# Patient Record
Sex: FEMALE | Race: WHITE | NOT HISPANIC OR LATINO | URBAN - METROPOLITAN AREA
[De-identification: names, ages, dates, MRNs, and addresses within clinical notes are randomized per-mention and may not be internally consistent; named-entity substitution may affect disease eponyms.]

---

## 2017-03-23 ENCOUNTER — IMPORTED ENCOUNTER (OUTPATIENT)
Dept: URBAN - METROPOLITAN AREA CLINIC 9 | Facility: CLINIC | Age: 57
End: 2017-03-23

## 2018-09-10 ENCOUNTER — IMPORTED ENCOUNTER (OUTPATIENT)
Dept: URBAN - METROPOLITAN AREA CLINIC 9 | Facility: CLINIC | Age: 58
End: 2018-09-10

## 2019-09-25 ENCOUNTER — IMPORTED ENCOUNTER (OUTPATIENT)
Dept: URBAN - METROPOLITAN AREA CLINIC 9 | Facility: CLINIC | Age: 59
End: 2019-09-25

## 2020-09-25 ENCOUNTER — IMPORTED ENCOUNTER (OUTPATIENT)
Dept: URBAN - METROPOLITAN AREA CLINIC 9 | Facility: CLINIC | Age: 60
End: 2020-09-25

## 2020-09-25 PROBLEM — H04.123: Noted: 2020-09-25

## 2020-09-25 PROBLEM — H25.13: Noted: 2020-09-25

## 2021-09-24 NOTE — PATIENT DISCUSSION
CATARACTS, OU - VISUALLY SIGNIFICANT ALTHOUGH PATIENT NOT BOTHERED. DISC OPTION OF EUD-NE-OGPHIA. GLASSES RX GIVEN TO FILL IF DESIRES. RE-EVALUATE IN 1 YEAR, SOONER IF VISION OR GLARE WORSENS.

## 2021-10-18 ASSESSMENT — VISUAL ACUITY
OD_CC: 20/20 SN
OD_SC: 20/20 - SN
OS_CC: 20/20 SN
OS_CC: 20/20 - SN
OS_SC: 20/20 - SN
OS_SC: 20/20 - SN
OS_CC: 20/20 SN
OD_SC: 20/20 - SN
OD_CC: 20/20 SN
OD_CC: 20/20 SN
OD_SC: 20/20 - SN
OD_CC: 20/20 SN
OD_SC: 20/20 -2 SN
OS_SC: 20/20 SN
OS_SC: 20/20 SN
OS_CC: 20/20 SN

## 2021-10-18 ASSESSMENT — KERATOMETRY
OD_K2POWER_DIOPTERS: 45.75
OS_AXISANGLE_DEGREES: 19
OS_K1POWER_DIOPTERS: 45.75
OS_K2POWER_DIOPTERS: 46.25
OD_AXISANGLE2_DEGREES: 90
OD_AXISANGLE_DEGREES: 180
OD_K1POWER_DIOPTERS: 45.25
OS_AXISANGLE_DEGREES: 7
OD_K1POWER_DIOPTERS: 45.25
OD_K1POWER_DIOPTERS: 44.75
OS_AXISANGLE_DEGREES: 180
OS_K2POWER_DIOPTERS: 45.75
OD_K1POWER_DIOPTERS: 45.25
OD_AXISANGLE2_DEGREES: 115
OD_AXISANGLE_DEGREES: 25
OD_AXISANGLE_DEGREES: 180
OS_AXISANGLE_DEGREES: 1
OD_AXISANGLE_DEGREES: 17
OS_K1POWER_DIOPTERS: 46
OS_AXISANGLE2_DEGREES: 97
OS_AXISANGLE2_DEGREES: 90
OD_AXISANGLE2_DEGREES: 90
OS_K1POWER_DIOPTERS: 45.5
OS_K2POWER_DIOPTERS: 46.25
OS_K2POWER_DIOPTERS: 45.75
OS_AXISANGLE2_DEGREES: 109
OD_K2POWER_DIOPTERS: 45.25
OS_AXISANGLE2_DEGREES: 91
OD_K2POWER_DIOPTERS: 45.25
OD_AXISANGLE2_DEGREES: 107
OD_K2POWER_DIOPTERS: 45.5
OS_K1POWER_DIOPTERS: 45.75

## 2021-10-18 ASSESSMENT — TONOMETRY
OD_IOP_MMHG: 14
OD_IOP_MMHG: 15
OD_IOP_MMHG: 14
OS_IOP_MMHG: 15
OS_IOP_MMHG: 12
OS_IOP_MMHG: 17
OS_IOP_MMHG: 15
OD_IOP_MMHG: 13

## 2022-01-05 ENCOUNTER — ESTABLISHED PATIENT (OUTPATIENT)
Dept: URBAN - METROPOLITAN AREA CLINIC 9 | Facility: CLINIC | Age: 62
End: 2022-01-05

## 2022-01-05 DIAGNOSIS — H25.13: ICD-10-CM

## 2022-01-05 DIAGNOSIS — H52.223: ICD-10-CM

## 2022-01-05 PROCEDURE — 92014 COMPRE OPH EXAM EST PT 1/>: CPT

## 2022-01-05 PROCEDURE — 92015 DETERMINE REFRACTIVE STATE: CPT

## 2022-01-05 ASSESSMENT — VISUAL ACUITY
OS_GLARE: 20/20
OS_SC: 20/20
OD_GLARE: 20/20
OD_SC: 20/20
OU_SC: 20/20

## 2022-01-05 ASSESSMENT — KERATOMETRY
OD_AXISANGLE2_DEGREES: 98
OS_AXISANGLE_DEGREES: 179
OS_K2POWER_DIOPTERS: 46.00
OD_K1POWER_DIOPTERS: 45.25
OS_AXISANGLE2_DEGREES: 89
OS_K1POWER_DIOPTERS: 45.75
OD_AXISANGLE_DEGREES: 8
OD_K2POWER_DIOPTERS: 45.50

## 2022-01-05 ASSESSMENT — TONOMETRY
OS_IOP_MMHG: 15
OD_IOP_MMHG: 15

## 2022-06-19 RX ORDER — MELOXICAM 15 MG/1
TABLET ORAL
COMMUNITY

## 2022-06-19 RX ORDER — ATORVASTATIN CALCIUM 20 MG/1
TABLET, FILM COATED ORAL
COMMUNITY

## 2022-06-19 RX ORDER — ESTRADIOL 1.53 MG/1
SPRAY TRANSDERMAL
COMMUNITY

## 2024-06-17 ENCOUNTER — ESTABLISHED PATIENT (OUTPATIENT)
Facility: LOCATION | Age: 64
End: 2024-06-17

## 2024-06-17 DIAGNOSIS — H25.13: ICD-10-CM

## 2024-06-17 DIAGNOSIS — H04.123: ICD-10-CM

## 2024-06-17 DIAGNOSIS — H52.223: ICD-10-CM

## 2024-06-17 PROCEDURE — 92015 DETERMINE REFRACTIVE STATE: CPT

## 2024-06-17 PROCEDURE — 92014 COMPRE OPH EXAM EST PT 1/>: CPT

## 2024-06-17 ASSESSMENT — VISUAL ACUITY
OS_GLARE: 20/30
OS_SC: 20/25
OU_SC: 20/20
OD_SC: 20/25-2
OD_GLARE: 20/30-2

## 2024-06-17 ASSESSMENT — KERATOMETRY
OD_K1POWER_DIOPTERS: 45.25
OD_AXISANGLE2_DEGREES: 94
OS_K2POWER_DIOPTERS: 46.00
OS_AXISANGLE_DEGREES: 22
OD_K2POWER_DIOPTERS: 45.50
OS_AXISANGLE2_DEGREES: 112
OD_AXISANGLE_DEGREES: 4
OS_K1POWER_DIOPTERS: 45.75

## 2024-06-17 ASSESSMENT — TONOMETRY
OD_IOP_MMHG: 16
OS_IOP_MMHG: 14